# Patient Record
Sex: FEMALE | Race: OTHER
[De-identification: names, ages, dates, MRNs, and addresses within clinical notes are randomized per-mention and may not be internally consistent; named-entity substitution may affect disease eponyms.]

---

## 2017-06-02 ENCOUNTER — HOSPITAL ENCOUNTER (EMERGENCY)
Dept: HOSPITAL 23 - SED | Age: 62
Discharge: HOME | End: 2017-06-02
Payer: COMMERCIAL

## 2017-06-02 DIAGNOSIS — Z98.890: ICD-10-CM

## 2017-06-02 DIAGNOSIS — Z88.5: ICD-10-CM
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
## 2017-06-02 DIAGNOSIS — S61.216A: Primary | ICD-10-CM

## 2017-06-02 DIAGNOSIS — Z79.82: ICD-10-CM

## 2017-06-02 DIAGNOSIS — Z86.73: ICD-10-CM

## 2018-12-27 ENCOUNTER — AMBULATORY SURGICAL CENTER (AMBULATORY)
Dept: URBAN - METROPOLITAN AREA SURGERY 17 | Facility: SURGERY | Age: 63
End: 2018-12-27
Payer: COMMERCIAL

## 2018-12-27 ENCOUNTER — OFFICE (AMBULATORY)
Dept: URBAN - METROPOLITAN AREA PATHOLOGY 4 | Facility: PATHOLOGY | Age: 63
End: 2018-12-27
Payer: COMMERCIAL

## 2018-12-27 VITALS
OXYGEN SATURATION: 99 % | OXYGEN SATURATION: 96 % | RESPIRATION RATE: 21 BRPM | RESPIRATION RATE: 19 BRPM | DIASTOLIC BLOOD PRESSURE: 62 MMHG | SYSTOLIC BLOOD PRESSURE: 100 MMHG | SYSTOLIC BLOOD PRESSURE: 127 MMHG | HEART RATE: 56 BPM | SYSTOLIC BLOOD PRESSURE: 95 MMHG | HEIGHT: 64 IN | RESPIRATION RATE: 16 BRPM | HEART RATE: 54 BPM | DIASTOLIC BLOOD PRESSURE: 70 MMHG | DIASTOLIC BLOOD PRESSURE: 60 MMHG | DIASTOLIC BLOOD PRESSURE: 51 MMHG | RESPIRATION RATE: 17 BRPM | SYSTOLIC BLOOD PRESSURE: 125 MMHG | RESPIRATION RATE: 18 BRPM | OXYGEN SATURATION: 97 % | SYSTOLIC BLOOD PRESSURE: 109 MMHG | RESPIRATION RATE: 24 BRPM | OXYGEN SATURATION: 98 % | DIASTOLIC BLOOD PRESSURE: 50 MMHG | TEMPERATURE: 97.6 F | HEART RATE: 58 BPM | SYSTOLIC BLOOD PRESSURE: 105 MMHG | HEART RATE: 57 BPM | OXYGEN SATURATION: 100 % | SYSTOLIC BLOOD PRESSURE: 111 MMHG | SYSTOLIC BLOOD PRESSURE: 88 MMHG | TEMPERATURE: 97.4 F | WEIGHT: 152 LBS | HEART RATE: 60 BPM | DIASTOLIC BLOOD PRESSURE: 73 MMHG | HEART RATE: 53 BPM | DIASTOLIC BLOOD PRESSURE: 55 MMHG

## 2018-12-27 DIAGNOSIS — D12.5 BENIGN NEOPLASM OF SIGMOID COLON: ICD-10-CM

## 2018-12-27 DIAGNOSIS — Z80.0 FAMILY HISTORY OF MALIGNANT NEOPLASM OF DIGESTIVE ORGANS: ICD-10-CM

## 2018-12-27 DIAGNOSIS — K64.8 OTHER HEMORRHOIDS: ICD-10-CM

## 2018-12-27 LAB
GI HISTOLOGY: A. UNSPECIFIED: (no result)
GI HISTOLOGY: PDF REPORT: (no result)

## 2018-12-27 PROCEDURE — 88305 TISSUE EXAM BY PATHOLOGIST: CPT | Mod: 33 | Performed by: INTERNAL MEDICINE

## 2018-12-27 PROCEDURE — 45380 COLONOSCOPY AND BIOPSY: CPT | Mod: 59,33 | Performed by: INTERNAL MEDICINE

## 2018-12-27 PROCEDURE — 45385 COLONOSCOPY W/LESION REMOVAL: CPT | Mod: 33 | Performed by: INTERNAL MEDICINE

## 2018-12-27 PROCEDURE — 45380 COLONOSCOPY AND BIOPSY: CPT | Mod: 33,59 | Performed by: INTERNAL MEDICINE

## 2018-12-27 NOTE — SERVICEHPINOTES
62 yo F w/ h/o HLD, vit D defic, CVA. Mother with colon ca ge 65. She denies having any GI symptoms. Last CN 5 years ago that she was told was negative.

## 2023-11-14 PROBLEM — Z80.0 FAMILY HISTORY OF MALIGNANT NEOPLASM OF GASTROINTESTINAL TRACT: Status: ACTIVE | Noted: 2018-12-27

## 2023-11-14 PROBLEM — D12.5 BENIGN NEOPLASM OF SIGMOID COLON: Status: ACTIVE | Noted: 2018-12-27
